# Patient Record
Sex: FEMALE | Race: WHITE
[De-identification: names, ages, dates, MRNs, and addresses within clinical notes are randomized per-mention and may not be internally consistent; named-entity substitution may affect disease eponyms.]

---

## 2018-05-25 ENCOUNTER — HOSPITAL ENCOUNTER (INPATIENT)
Dept: HOSPITAL 17 - N03B | Age: 28
LOS: 3 days | Discharge: HOME | DRG: 872 | End: 2018-05-28
Attending: HOSPITALIST | Admitting: HOSPITALIST
Payer: COMMERCIAL

## 2018-05-25 VITALS
TEMPERATURE: 99.3 F | DIASTOLIC BLOOD PRESSURE: 77 MMHG | RESPIRATION RATE: 15 BRPM | OXYGEN SATURATION: 100 % | HEART RATE: 121 BPM | SYSTOLIC BLOOD PRESSURE: 126 MMHG

## 2018-05-25 VITALS — WEIGHT: 170.42 LBS | BODY MASS INDEX: 30.2 KG/M2 | HEIGHT: 63 IN

## 2018-05-25 VITALS
TEMPERATURE: 99.9 F | OXYGEN SATURATION: 100 % | DIASTOLIC BLOOD PRESSURE: 85 MMHG | SYSTOLIC BLOOD PRESSURE: 140 MMHG | HEART RATE: 110 BPM | RESPIRATION RATE: 22 BRPM

## 2018-05-25 VITALS
SYSTOLIC BLOOD PRESSURE: 130 MMHG | OXYGEN SATURATION: 100 % | DIASTOLIC BLOOD PRESSURE: 71 MMHG | TEMPERATURE: 99.7 F | HEART RATE: 116 BPM | RESPIRATION RATE: 20 BRPM

## 2018-05-25 VITALS — HEART RATE: 112 BPM

## 2018-05-25 VITALS — HEART RATE: 110 BPM

## 2018-05-25 VITALS — HEART RATE: 97 BPM

## 2018-05-25 VITALS — HEART RATE: 116 BPM

## 2018-05-25 DIAGNOSIS — K02.9: ICD-10-CM

## 2018-05-25 DIAGNOSIS — E87.6: ICD-10-CM

## 2018-05-25 DIAGNOSIS — R65.20: ICD-10-CM

## 2018-05-25 DIAGNOSIS — F17.210: ICD-10-CM

## 2018-05-25 DIAGNOSIS — G35: ICD-10-CM

## 2018-05-25 DIAGNOSIS — K12.2: ICD-10-CM

## 2018-05-25 DIAGNOSIS — Z88.1: ICD-10-CM

## 2018-05-25 DIAGNOSIS — G43.909: ICD-10-CM

## 2018-05-25 DIAGNOSIS — A41.9: Primary | ICD-10-CM

## 2018-05-25 DIAGNOSIS — L02.01: ICD-10-CM

## 2018-05-25 DIAGNOSIS — H46.9: ICD-10-CM

## 2018-05-25 DIAGNOSIS — K04.7: ICD-10-CM

## 2018-05-25 LAB
ALBUMIN SERPL-MCNC: 3.1 GM/DL (ref 3.4–5)
ALP SERPL-CCNC: 132 U/L (ref 45–117)
ALT SERPL-CCNC: 43 U/L (ref 10–53)
AST SERPL-CCNC: 15 U/L (ref 15–37)
BASOPHILS # BLD AUTO: 0 TH/MM3 (ref 0–0.2)
BASOPHILS NFR BLD: 0.2 % (ref 0–2)
BILIRUB SERPL-MCNC: 0.5 MG/DL (ref 0.2–1)
BUN SERPL-MCNC: 3 MG/DL (ref 7–18)
CALCIUM SERPL-MCNC: 9.1 MG/DL (ref 8.5–10.1)
CHLORIDE SERPL-SCNC: 103 MEQ/L (ref 98–107)
CREAT SERPL-MCNC: 0.61 MG/DL (ref 0.5–1)
EOSINOPHIL # BLD: 0.1 TH/MM3 (ref 0–0.4)
EOSINOPHIL NFR BLD: 0.3 % (ref 0–4)
ERYTHROCYTE [DISTWIDTH] IN BLOOD BY AUTOMATED COUNT: 13.1 % (ref 11.6–17.2)
GFR SERPLBLD BASED ON 1.73 SQ M-ARVRAT: 117 ML/MIN (ref 89–?)
GLUCOSE SERPL-MCNC: 82 MG/DL (ref 74–106)
HCO3 BLD-SCNC: 24.9 MEQ/L (ref 21–32)
HCT VFR BLD CALC: 35.9 % (ref 35–46)
HGB BLD-MCNC: 11.9 GM/DL (ref 11.6–15.3)
LYMPHOCYTES # BLD AUTO: 1.3 TH/MM3 (ref 1–4.8)
LYMPHOCYTES NFR BLD AUTO: 7.3 % (ref 9–44)
MCH RBC QN AUTO: 29.2 PG (ref 27–34)
MCHC RBC AUTO-ENTMCNC: 33.3 % (ref 32–36)
MCV RBC AUTO: 87.9 FL (ref 80–100)
MONOCYTE #: 1.6 TH/MM3 (ref 0–0.9)
MONOCYTES NFR BLD: 9.1 % (ref 0–8)
NEUTROPHILS # BLD AUTO: 15.1 TH/MM3 (ref 1.8–7.7)
NEUTROPHILS NFR BLD AUTO: 83.1 % (ref 16–70)
PLATELET # BLD: 468 TH/MM3 (ref 150–450)
PMV BLD AUTO: 8.6 FL (ref 7–11)
PROT SERPL-MCNC: 7.6 GM/DL (ref 6.4–8.2)
RBC # BLD AUTO: 4.08 MIL/MM3 (ref 4–5.3)
SODIUM SERPL-SCNC: 138 MEQ/L (ref 136–145)
WBC # BLD AUTO: 18.2 TH/MM3 (ref 4–11)

## 2018-05-25 PROCEDURE — 87116 MYCOBACTERIA CULTURE: CPT

## 2018-05-25 PROCEDURE — 87040 BLOOD CULTURE FOR BACTERIA: CPT

## 2018-05-25 PROCEDURE — 80202 ASSAY OF VANCOMYCIN: CPT

## 2018-05-25 PROCEDURE — 80053 COMPREHEN METABOLIC PANEL: CPT

## 2018-05-25 PROCEDURE — 87205 SMEAR GRAM STAIN: CPT

## 2018-05-25 PROCEDURE — 85025 COMPLETE CBC W/AUTO DIFF WBC: CPT

## 2018-05-25 PROCEDURE — 83735 ASSAY OF MAGNESIUM: CPT

## 2018-05-25 PROCEDURE — 70487 CT MAXILLOFACIAL W/DYE: CPT

## 2018-05-25 PROCEDURE — 87015 SPECIMEN INFECT AGNT CONCNTJ: CPT

## 2018-05-25 PROCEDURE — 87102 FUNGUS ISOLATION CULTURE: CPT

## 2018-05-25 PROCEDURE — 87070 CULTURE OTHR SPECIMN AEROBIC: CPT

## 2018-05-25 PROCEDURE — 93005 ELECTROCARDIOGRAM TRACING: CPT

## 2018-05-25 PROCEDURE — 87641 MR-STAPH DNA AMP PROBE: CPT

## 2018-05-25 PROCEDURE — 87206 SMEAR FLUORESCENT/ACID STAI: CPT

## 2018-05-25 PROCEDURE — 80048 BASIC METABOLIC PNL TOTAL CA: CPT

## 2018-05-25 PROCEDURE — 83605 ASSAY OF LACTIC ACID: CPT

## 2018-05-25 RX ADMIN — FAMOTIDINE SCH MG: 10 INJECTION, SOLUTION INTRAVENOUS at 20:12

## 2018-05-25 RX ADMIN — PHENYTOIN SODIUM SCH MLS/HR: 50 INJECTION INTRAMUSCULAR; INTRAVENOUS at 15:00

## 2018-05-25 RX ADMIN — ONDANSETRON PRN MG: 4 TABLET, ORALLY DISINTEGRATING ORAL at 17:34

## 2018-05-25 RX ADMIN — NYSTATIN SCH ML: 100000 SUSPENSION ORAL at 17:34

## 2018-05-25 RX ADMIN — MORPHINE SULFATE PRN MG: 2 INJECTION, SOLUTION INTRAMUSCULAR; INTRAVENOUS at 15:47

## 2018-05-25 RX ADMIN — AZTREONAM SCH MLS/HR: 2 INJECTION, POWDER, LYOPHILIZED, FOR SOLUTION INTRAMUSCULAR; INTRAVENOUS at 20:11

## 2018-05-25 RX ADMIN — DEXAMETHASONE SODIUM PHOSPHATE SCH MG: 4 INJECTION, SOLUTION INTRAMUSCULAR; INTRAVENOUS at 17:34

## 2018-05-25 RX ADMIN — MORPHINE SULFATE PRN MG: 2 INJECTION, SOLUTION INTRAMUSCULAR; INTRAVENOUS at 20:14

## 2018-05-25 RX ADMIN — MORPHINE SULFATE PRN MG: 2 INJECTION, SOLUTION INTRAMUSCULAR; INTRAVENOUS at 18:03

## 2018-05-25 RX ADMIN — SODIUM CHLORIDE SCH MLS/HR: 900 INJECTION, SOLUTION INTRAVENOUS at 21:30

## 2018-05-25 RX ADMIN — AZTREONAM SCH MLS/HR: 2 INJECTION, POWDER, LYOPHILIZED, FOR SOLUTION INTRAMUSCULAR; INTRAVENOUS at 15:00

## 2018-05-25 RX ADMIN — NYSTATIN SCH ML: 100000 SUSPENSION ORAL at 20:12

## 2018-05-25 RX ADMIN — DEXAMETHASONE SODIUM PHOSPHATE SCH MG: 4 INJECTION, SOLUTION INTRAMUSCULAR; INTRAVENOUS at 14:15

## 2018-05-25 RX ADMIN — SODIUM CHLORIDE SCH MLS/HR: 900 INJECTION, SOLUTION INTRAVENOUS at 16:00

## 2018-05-25 RX ADMIN — SODIUM CHLORIDE SCH MLS/HR: 900 INJECTION, SOLUTION INTRAVENOUS at 10:41

## 2018-05-25 NOTE — HHI.HP
HPI


Service


Critical Care Medicine


Primary Care Physician


Unknown


Admission Diagnosis





Diagnosis:  


(1) Right facial abscess


Diagnosis:  Principal





(2) Sepsis


Diagnosis:  Principal





(3) Optic neuritis


Diagnosis:  Secondary





(4) Multiple sclerosis


Diagnosis:  Secondary





(5) Migraine


Diagnosis:  Secondary





Chief Complaint:  


Right facial swelling


Travel History


International Travel<30 Days:  No


Contact w/Intl Traveler <30 Da:  No


Traveled to Known Affected Are:  No





Sepsis Criteria


SIRS Criteria (2 or more):  Heart rate over 90, WBC > 55179, < 4000 or > 10% 

bands


Sepsis Criteria (SIRS+source):  Infect source susp/known


Criteria Outcome:  Meets sepsis criteria


History of Present Illness


Patient is a 27-year-old  female with past medical history of multiple 

sclerosis, migraine headaches.  Patient states that approximately 2 weeks ago 

started having pain and swelling of the right side of the cheek, which 

continued to increase in size despite treatment with antibiotics as outpatient (

5 days of amoxicillin followed by 7 days of Levofloxacin).  Patient was 

admitted to the [facility] yesterday under the primary care doctor 

and a CT of the face apparently showed right sided abscess. (Unfortunately 

there is no CT images or report for review).  Patient had been receiving 

vancomycin and Levaquin at the outside hospital.  Labs from [facility] reviewed, WBC count was 18.7 other pertinent labs included a potassium 

of 3.2.  Due to lack of improvement and unavailability of ENT consultant, 

patient was transferred to [facility].  I evaluated the patient 

immediately in ICU.  Patient has enlarged facial swelling on the right side of 

the face which is very tender.  Patient also had subjective fever, currently is 

tachycardic heart rate in 120s.  I have requested a repeat CT of the face with 

IV contrast.  I will start her on IV vancomycin, Azactam, Flagyl.  Consult 

requested from oral and maxillofacial surgery.  I am unable to examine the oral 

cavity in detail due to severe pain in mouth opening, but most likely patient 

has dental abscess with spread to surrounding soft tissue





Review of Systems


ROS Limitations:  Other (as per HPI)





Past Family Social History


Allergies:  


Coded Allergies:  


     cephalexin (Verified  Allergy, Severe, 18)


 Blisters


Past Medical History


Multiple sclerosis


Optic neuritis flareup approximately 2 weeks ago


Migraine headache


Past Surgical History


Knee surgery


Laparoscopy


Reported Medications


Do not take any scheduled medications for MS


Fioricet as needed for migraine


Active Ordered Medications


Reviewed


Family History


Mother with MS


Social History


Smokes about a pack of cigarettes a day, occasional alcohol use


Denies illicit drug use





Physical Exam


Physical Exam


GENERAL: 27-year-old  female who is in moderate distress due to pain


SKIN: Warm and dry.


HEAD: Atraumatic. Normocephalic. 


ENT: Right side of the face is diffusely swollen predominantly over the right 

mandibular angle region.  Mouth opening is limited due to pain and this limits 

oral cavity exam


NECK: Trachea midline.  No obstruction.  No stridor


CARDIOVASCULAR: Tachycardic rate and rhythm.  S1, S2 normal.  No JVD.


RESPIRATORY: No accessory muscle use. Clear to auscultation.  No adventitious 

sounds.  


GASTROINTESTINAL: Abdomen soft, non-tender, nondistended.  No guarding.  


MUSCULOSKELETAL: Extremities without clubbing, cyanosis, or edema. No obvious 

deformities.  Warm, well perfused.


NEUROLOGICAL: Awake and alert. No obvious cranial nerve deficits.  Motor 

grossly normal.  Normal speech.





Septic Shock Reassessment


Septic shock perfusion:  reassessment completed





Caprini VTE Risk Assessment


Caprini VTE Risk Assessment:  Mod/High Risk (score >= 2)


Caprini Risk Assessment Model











 Point Value = 1          Point Value = 2  Point Value = 3  Point Value = 5


 


Age 41-60


Minor surgery


BMI > 25 kg/m2


Swollen legs


Varicose veins


Pregnancy or postpartum


History of unexplained or recurrent


   spontaneous 


Oral contraceptives or hormone


   replacement


Sepsis (< 1 month)


Serious lung disease, including


   pneumonia (< 1 month)


Abnormal pulmonary function


Acute myocardial infarction


Congestive heart failure (< 1 month)


History of inflammatory bowel disease


Medical patient at bed rest Age 61-74


Arthroscopic surgery


Major open surgery (> 45 min)


Laparoscopic surgery (> 45 min)


Malignancy


Confined to bed (> 72 hours)


Immobilizing plaster cast


Central venous access Age >= 75


History of VTE


Family history of VTE


Factor V Leiden


Prothrombin 69943G


Lupus anticoagulant


Anticardiolipin antibodies


Elevated serum homocysteine


Heparin-induced thrombocytopenia


Other congenital or acquired


   thrombophilia Stroke (< 1 month)


Elective arthroplasty


Hip, pelvis, or leg fracture


Acute spinal cord injury (< 1 month)








Prophylaxis Regimen











   Total Risk


Factor Score Risk Level Prophylaxis Regimen


 


0-1      Low Early ambulation


 


2 Moderate Order ONE of the following:


*Sequential Compression Device (SCD)


*Heparin 5000 units SQ BID


 


3-4 Higher Order ONE of the following medications:


*Heparin 5000 units SQ TID


*Enoxaparin/Lovenox 40 mg SQ daily (WT < 150 kg, CrCl > 30 mL/min)


*Enoxaparin/Lovenox 30 mg SQ daily (WT < 150 kg, CrCl > 10-29 mL/min)


*Enoxaparin/Lovenox 30 mg SQ BID (WT < 150 kg, CrCl > 30 mL/min)


AND/OR


*Sequential Compression Device (SCD)


 


5 or more Highest Order ONE of the following medications:


*Heparin 5000 units SQ TID (Preferred with Epidurals)


*Enoxaparin/Lovenox 40 mg SQ daily (WT < 150 kg, CrCl > 30 mL/min)


*Enoxaparin/Lovenox 30 mg SQ daily (WT < 150 kg, CrCl > 10-29 mL/min)


*Enoxaparin/Lovenox 30 mg SQ BID (WT < 150 kg, CrCl > 30 mL/min)


AND


*Sequential Compression Device (SCD)











Assessment and Plan


Assessment and Plan


NEURO: 


Right facial pain


Migraine headaches


Multiple sclerosis


-Morphine as needed for pain


-Avoid excessive sedation





RESP/ENT/ID: 


Right facial swelling (Differential Dental abscess vs Parotitis/stone)


Sepsis


-Nasal cannula oxygen


-DuoNeb every 6 hours as needed


-CT facial bones with IV contrast Stat


-Broad-spectrum antibiotics with vancomycin, Azactam, Flagyl


-OMFS consulted


-Decadron 4 mg IV every 6 hours for facial swelling for 48 hours


-Blood cultures requested





CV: 


Sinus tachycardia/SIRS


-Normal saline IV fluids 1L bolus and 84 ml per hour


-Monitor heart rate blood pressure





GI: 


-NPO until OMFS plan is determined.  IV famotidine





: 


-Monitor renal function closely.  No indication for Turner catheter at this





HEME: 


-Monitor CBC, CMP, coags





ENDO: 


Hypokalemia


-Electrolyte replacement per protocol





PROPH: 


-Bilateral lower extremity SCDs.  Hold off chemical DVT prophylaxis at this 

time in anticipation of surgical procedures.  IV famotidine for GI prophylaxis





LINES: 


-Utilize peripheral IVs, central line if needed





CC time 35 min


Code Status


Full


Discussed Condition With


Dr. Duran





Problem Qualifiers





(1) Sepsis:  


Qualified Codes:  A41.9 - Sepsis, unspecified organism


(2) Migraine:  








Irais Pearl MD May 25, 2018 14:04

## 2018-05-25 NOTE — MB
cc:

Sameer Duran DMD

Sameer Duran DMD

****

 

 

DATE:

05/25/2018

 

FAX TO DR. DURAN'S OFFICE

 

REASON FOR CONSULTATION:

Right facial abscess.

 

HISTORY OF PRESENT ILLNESS:

This is a 27-year-old female that has been having, for the last 10 

days, swelling on the right side of the face.  It has been 

progressively getting worse.  She was seen at an urgent care center 

and she was seen by her physician and given antibiotics but it never 

resolved.  She was then admitted to [facility] up in 

[place]. She was transferred over here to [facility] for evaluation.  

I have seen and examined this patient this evening.  Her , 

family/friends and nurse are at bedside.  She is alert, awake and 

oriented x3 in no acute distress.  Denies any fever, chills, nausea, 

vomiting, any shortness of breath or any difficulty breathing or any 

difficulty swallowing.  She is hungry, wants to eat something.  She 

does not have a dentist or an oral surgeon.

 

PAST MEDICAL HISTORY:

Migraines, also multiple sclerosis.

 

MEDICATIONS:

Denies.

 

ALLERGIES:

CEPHALEXIN WHICH CAUSES BLISTERS.

 

PAST SURGICAL HISTORY:

She says she had a knee surgery.

 

SOCIAL HISTORY:

Smokes 1 pack per day of cigarettes, occasional alcohol, but denies 

any illicit drug use.

 

PHYSICAL EXAMINATION:

Facial area and the neck has been palpated.  The trachea is at 

midline.  She has a swelling on the right side of his face posteriorly

extending from the mid face all the way down through the angle of the 

mandible.  It is soft.  It is tender to palpation.  Appears to have 

fluctuance that is sitting there.  Intraorally, she has some trismus 

that is opening, but I am able to put approximately 1/2 fingerbreadth 

width apart and able to open the mouth, but she is guarding against 

pain.  I do not see any elevation of floor of the mouth or the tongue.

 The rest of the exam is limited secondary to her swelling.

 

There is no active pus that is noted.  She has generalized poor 

dentition.

 

IMAGING STUDIES:

CT scan of the facial bones shows swelling right side of the face 

extending to the neck involving the masseteric space, extending down 

to the medial aspect of the mandible going to past the mylohyoid 

muscle, submandibular region, sublingual region.  The scan also shows 

that she appears to be wisdom tooth #32 with a periapical radiolucency

that is broken into the cortex on the lingual.  She also has tooth #31

which is a second molar which got decay on it, appears to have a 

periapical radiolucency on that also and then she also appears to have

involvement of tooth #30.  We will evaluate that clinically also 

during the procedure tomorrow.

 

She has a right maxillary sinus.  She has a mucocele inflammation 

there.

 

Temperature 99.7, pulse is 116.

 

LABORATORY DATA:

White count is 18.2 with an H and H of  11.9/ 35.9 with platelets of 

468.

 

IMPRESSION AND PLAN:

This is a 27-year-old female with longstanding history of swelling now

with decayed teeth, specifically #32 and #31 possibly #30 involving a 

small size space abscess/swelling involving the right face, right 

submandibular region, medial aspect of the mandible and it is taking 

into the masseteric space.

 

PLAN:

Extract teeth #31 and 32 and possibly #30 tomorrow.  Incision and 

drainage of this multi-space infection/abscess.  Benefits, risks, 

indications of the procedure, procedure in detail and the options of 

no treatment were all discussed with this patient.  Risks not limited 

any postop pain, infection, bleeding, damage to the adjacent teeth, 

soft tissue, hard tissue, anesthesia complication which includes 

death, recurrence of the infection, further surgeries as required.  

Discussed with the patient in detail that if I see any teeth have 

mobility and severe decayed,  they will be taken out on the right side

of the mandible specifically in the posterior site.  Dental implants 

or any other restorative options was also discussed.

 

The patient has been counseled on the importance of dental hygiene, 

following up with  a dentist and an  oral surgeon.  All questions and 

concerns were addressed.

 

 

__________________________________

Sameer Duran DMD

 

 

RT/SA

D: 05/25/2018, 07:08 PM

T: 05/25/2018, 08:22 PM

Visit #: [id number]

Job #: [id number]

## 2018-05-25 NOTE — RADRPT
EXAM DATE:  5/25/2018 4:27 PM EDT

AGE/SEX:        27 years / Female



INDICATIONS:  Right facial swelling.  Evaluate for abscess. 



CLINICAL DATA:  This is the patient's subsequent encounter. Patient reports that signs and symptoms h
ave been present for 2 weeks and indicates a pain score of 7/10. 

                                                                          

MEDICAL/SURGICAL HISTORY:       None. None.



RADIATION DOSE:  53.40 CTDI (mGy)









COMPARISON:      No prior [place] exams available for comparison.



TECHNIQUE:  Contiguous images in the axial and coronal planes were obtained using helical multirow de
tector technique with 80 ml Omnipaque 350 (iohexol)  nonionic water-soluble contrast as a single exam
 dose.  Using automated exposure control and adjustment of the mA and/or kV according to patient size
, radiation dose was kept as low as reasonably achievable to obtain optimal diagnostic quality images
.



FINDINGS: 

There is periapical lucency around the posterior most mandibular molar tooth on the right with medial
 cortical breakthrough. There is a multilocular abscess in the surrounding perimandibular tissues wit
h protrusion of loculated fluid in the cord producing displacement of the floor of mouth structures a
nd submandibular salivary gland.



Elsewhere, the orbits are symmetric and unremarkable. There is a mucous retention cyst in the right m
axillary sinus.



There are small cervical lymph nodes present. No adenopathy.





CONCLUSION:

Mandibular molar tooth abscess with surrounding multilocular perimandibular soft tissue abscess.



Electronically signed by: Dae Mendez MD  5/25/2018 4:35 PM EDT

## 2018-05-26 VITALS
HEART RATE: 96 BPM | TEMPERATURE: 98.7 F | OXYGEN SATURATION: 97 % | RESPIRATION RATE: 19 BRPM | DIASTOLIC BLOOD PRESSURE: 73 MMHG | SYSTOLIC BLOOD PRESSURE: 133 MMHG

## 2018-05-26 VITALS
SYSTOLIC BLOOD PRESSURE: 134 MMHG | OXYGEN SATURATION: 98 % | TEMPERATURE: 99 F | HEART RATE: 118 BPM | DIASTOLIC BLOOD PRESSURE: 76 MMHG | RESPIRATION RATE: 17 BRPM

## 2018-05-26 VITALS
DIASTOLIC BLOOD PRESSURE: 64 MMHG | SYSTOLIC BLOOD PRESSURE: 127 MMHG | HEART RATE: 97 BPM | OXYGEN SATURATION: 98 % | TEMPERATURE: 97.9 F | RESPIRATION RATE: 16 BRPM

## 2018-05-26 VITALS
OXYGEN SATURATION: 100 % | SYSTOLIC BLOOD PRESSURE: 128 MMHG | DIASTOLIC BLOOD PRESSURE: 73 MMHG | RESPIRATION RATE: 16 BRPM | HEART RATE: 97 BPM | TEMPERATURE: 99.2 F

## 2018-05-26 VITALS
DIASTOLIC BLOOD PRESSURE: 67 MMHG | SYSTOLIC BLOOD PRESSURE: 123 MMHG | OXYGEN SATURATION: 100 % | TEMPERATURE: 99 F | RESPIRATION RATE: 17 BRPM | HEART RATE: 87 BPM

## 2018-05-26 VITALS
RESPIRATION RATE: 14 BRPM | HEART RATE: 88 BPM | OXYGEN SATURATION: 100 % | DIASTOLIC BLOOD PRESSURE: 72 MMHG | SYSTOLIC BLOOD PRESSURE: 120 MMHG | TEMPERATURE: 98.9 F

## 2018-05-26 VITALS — HEART RATE: 88 BPM

## 2018-05-26 VITALS — HEART RATE: 87 BPM

## 2018-05-26 VITALS — HEART RATE: 89 BPM

## 2018-05-26 LAB
BUN SERPL-MCNC: 6 MG/DL (ref 7–18)
CALCIUM SERPL-MCNC: 8.8 MG/DL (ref 8.5–10.1)
CHLORIDE SERPL-SCNC: 108 MEQ/L (ref 98–107)
CREAT SERPL-MCNC: 0.49 MG/DL (ref 0.5–1)
GFR SERPLBLD BASED ON 1.73 SQ M-ARVRAT: 150 ML/MIN (ref 89–?)
GLUCOSE SERPL-MCNC: 135 MG/DL (ref 74–106)
HCO3 BLD-SCNC: 22.1 MEQ/L (ref 21–32)
MAGNESIUM SERPL-MCNC: 2.3 MG/DL (ref 1.5–2.5)
SODIUM SERPL-SCNC: 139 MEQ/L (ref 136–145)

## 2018-05-26 PROCEDURE — 0CDXXZ1 EXTRACTION OF LOWER TOOTH, MULTIPLE, EXTERNAL APPROACH: ICD-10-PCS | Performed by: DENTIST

## 2018-05-26 PROCEDURE — 0W9530Z DRAINAGE OF LOWER JAW WITH DRAINAGE DEVICE, PERCUTANEOUS APPROACH: ICD-10-PCS | Performed by: DENTIST

## 2018-05-26 RX ADMIN — DEXAMETHASONE SODIUM PHOSPHATE SCH MG: 4 INJECTION, SOLUTION INTRAMUSCULAR; INTRAVENOUS at 05:28

## 2018-05-26 RX ADMIN — VANCOMYCIN HYDROCHLORIDE SCH MLS/HR: 1 INJECTION, SOLUTION INTRAVENOUS at 16:22

## 2018-05-26 RX ADMIN — FAMOTIDINE SCH MG: 10 INJECTION, SOLUTION INTRAVENOUS at 21:00

## 2018-05-26 RX ADMIN — NICOTINE SCH PATCH: 21 PATCH, EXTENDED RELEASE TOPICAL at 14:09

## 2018-05-26 RX ADMIN — HYDROMORPHONE HYDROCHLORIDE PRN MG: 1 INJECTION, SOLUTION INTRAMUSCULAR; INTRAVENOUS; SUBCUTANEOUS at 18:21

## 2018-05-26 RX ADMIN — ONDANSETRON PRN MG: 4 TABLET, ORALLY DISINTEGRATING ORAL at 02:59

## 2018-05-26 RX ADMIN — AZTREONAM SCH MLS/HR: 2 INJECTION, POWDER, LYOPHILIZED, FOR SOLUTION INTRAMUSCULAR; INTRAVENOUS at 09:00

## 2018-05-26 RX ADMIN — HYDROMORPHONE HYDROCHLORIDE PRN MG: 1 INJECTION, SOLUTION INTRAMUSCULAR; INTRAVENOUS; SUBCUTANEOUS at 22:20

## 2018-05-26 RX ADMIN — MORPHINE SULFATE PRN MG: 2 INJECTION, SOLUTION INTRAMUSCULAR; INTRAVENOUS at 21:12

## 2018-05-26 RX ADMIN — SODIUM CHLORIDE SCH MLS/HR: 900 INJECTION, SOLUTION INTRAVENOUS at 16:59

## 2018-05-26 RX ADMIN — SODIUM CHLORIDE SCH MLS/HR: 900 INJECTION, SOLUTION INTRAVENOUS at 04:20

## 2018-05-26 RX ADMIN — HYDROMORPHONE HYDROCHLORIDE PRN MG: 1 INJECTION, SOLUTION INTRAMUSCULAR; INTRAVENOUS; SUBCUTANEOUS at 15:01

## 2018-05-26 RX ADMIN — MORPHINE SULFATE PRN MG: 2 INJECTION, SOLUTION INTRAMUSCULAR; INTRAVENOUS at 05:28

## 2018-05-26 RX ADMIN — VANCOMYCIN HYDROCHLORIDE SCH MLS/HR: 1 INJECTION, SOLUTION INTRAVENOUS at 07:50

## 2018-05-26 RX ADMIN — MORPHINE SULFATE PRN MG: 2 INJECTION, SOLUTION INTRAMUSCULAR; INTRAVENOUS at 00:04

## 2018-05-26 RX ADMIN — MORPHINE SULFATE PRN MG: 2 INJECTION, SOLUTION INTRAMUSCULAR; INTRAVENOUS at 07:45

## 2018-05-26 RX ADMIN — NYSTATIN SCH ML: 100000 SUSPENSION ORAL at 13:06

## 2018-05-26 RX ADMIN — CHLORHEXIDINE GLUCONATE SCH PACK: 500 CLOTH TOPICAL at 04:20

## 2018-05-26 RX ADMIN — AZTREONAM SCH MLS/HR: 2 INJECTION, POWDER, LYOPHILIZED, FOR SOLUTION INTRAMUSCULAR; INTRAVENOUS at 02:45

## 2018-05-26 RX ADMIN — PHENYTOIN SODIUM SCH MLS/HR: 50 INJECTION INTRAMUSCULAR; INTRAVENOUS at 01:00

## 2018-05-26 RX ADMIN — MORPHINE SULFATE PRN MG: 2 INJECTION, SOLUTION INTRAMUSCULAR; INTRAVENOUS at 16:26

## 2018-05-26 RX ADMIN — ONDANSETRON PRN MG: 4 TABLET, ORALLY DISINTEGRATING ORAL at 21:00

## 2018-05-26 RX ADMIN — MORPHINE SULFATE PRN MG: 2 INJECTION, SOLUTION INTRAMUSCULAR; INTRAVENOUS at 13:06

## 2018-05-26 RX ADMIN — AZTREONAM SCH MLS/HR: 2 INJECTION, POWDER, LYOPHILIZED, FOR SOLUTION INTRAMUSCULAR; INTRAVENOUS at 22:17

## 2018-05-26 RX ADMIN — NYSTATIN SCH ML: 100000 SUSPENSION ORAL at 22:17

## 2018-05-26 RX ADMIN — DEXAMETHASONE SODIUM PHOSPHATE SCH MG: 4 INJECTION, SOLUTION INTRAMUSCULAR; INTRAVENOUS at 00:04

## 2018-05-26 RX ADMIN — NYSTATIN SCH ML: 100000 SUSPENSION ORAL at 09:00

## 2018-05-26 RX ADMIN — AZTREONAM SCH MLS/HR: 2 INJECTION, POWDER, LYOPHILIZED, FOR SOLUTION INTRAMUSCULAR; INTRAVENOUS at 14:43

## 2018-05-26 RX ADMIN — MORPHINE SULFATE PRN MG: 2 INJECTION, SOLUTION INTRAMUSCULAR; INTRAVENOUS at 11:12

## 2018-05-26 RX ADMIN — SODIUM CHLORIDE SCH MLS/HR: 900 INJECTION, SOLUTION INTRAVENOUS at 22:18

## 2018-05-26 RX ADMIN — PHENYTOIN SODIUM SCH MLS/HR: 50 INJECTION INTRAMUSCULAR; INTRAVENOUS at 22:15

## 2018-05-26 RX ADMIN — FAMOTIDINE SCH MG: 10 INJECTION, SOLUTION INTRAVENOUS at 09:00

## 2018-05-26 RX ADMIN — NYSTATIN SCH ML: 100000 SUSPENSION ORAL at 18:00

## 2018-05-26 RX ADMIN — VANCOMYCIN HYDROCHLORIDE SCH MLS/HR: 1 INJECTION, SOLUTION INTRAVENOUS at 00:04

## 2018-05-26 NOTE — EKG
Date Performed: 05/26/2018       Time Performed: 03:40:14

 

PTAGE:      27 years

 

EKG:      Sinus rhythm 

 

 rSr'(V1) - probable normal variant Normal ECG 

 

NO PREVIOUS TRACING            

 

DOCTOR:   Alli Duvall  Interpretating Date/Time  05/26/2018 13:33:08

## 2018-05-26 NOTE — HHI.CCPN
Subjective


Remarks/Hospital Course


Diagnosis:  


(1) Right facial abscess


Diagnosis:  Principal





(2) Sepsis, severe


Diagnosis:  Principal





(3) Optic neuritis


Diagnosis:  Secondary





(4) Multiple sclerosis


Diagnosis:  Secondary





(5) Migraine


Diagnosis:  Secondary





Chief Complaint:  


Right facial swelling








Patient is a 27-year-old  female with past medical history of multiple 

sclerosis, migraine headaches.  Patient states that approximately 2 weeks ago 

started having pain and swelling of the right side of the cheek, which 

continued to increase in size despite treatment with antibiotics as outpatient (

5 days of amoxicillin followed by 7 days of Levofloxacin).  Patient was 

admitted to the [facility] yesterday under the primary care doctor 

and a CT of the face apparently showed right sided abscess. (Unfortunately 

there is no CT images or report for review).  Patient had been receiving 

vancomycin and Levaquin at the outside hospital.  Labs from [facility] reviewed, WBC count was 18.7 other pertinent labs included a potassium 

of 3.2.  Due to lack of improvement and unavailability of ENT consultant, 

patient was transferred to [facility].  I evaluated the patient 

immediately in ICU.  Patient has enlarged facial swelling on the right side of 

the face which is very tender.  Patient also had subjective fever, currently is 

tachycardic heart rate in 120s.  I have requested a repeat CT of the face with 

IV contrast.  I will start her on IV vancomycin, Azactam, Flagyl.  Consult 

requested from oral and maxillofacial surgery.  I am unable to examine the oral 

cavity in detail due to severe pain in mouth opening, but most likely patient 

has dental abscess with spread to surrounding soft tissue





05/26: Tachycardia, fever, leukocytosis. Molar abscess right side will require 

teeth extraction and drainage. Remains septic. Broad antibiotic coverage 

infusing. Suitable for surgery today.





Objective





Vital Signs








  Date Time  Temp Pulse Resp B/P (MAP) Pulse Ox O2 Delivery O2 Flow Rate FiO2


 


5/26/18 06:00  89      


 


5/26/18 04:00 98.9  14 120/72 (88) 100   


 


5/25/18 19:00      Room Air  








Result Diagram:  


5/25/18 1440                                                                   

             5/26/18 0402





Objective Remarks


GENERAL: 27-year-old  female, in moderate distress due to mandibular 

pain


SKIN: Warm and dry.


HEAD: Atraumatic. Normocephalic. 


ENT: Right side of the face is diffusely swollen predominantly over the right 

mandibular angle. Mouth opening is limited due to pain and this limits oral 

cavity exam


NECK: Trachea midline.  No obstruction.  No stridor. Airway widely patent.


CARDIOVASCULAR: Tachycardic rate and rhythm.  S1, S2 normal.  No JVD.


RESPIRATORY: No accessory muscle use. Clear to auscultation.  No adventitious 

sounds. Comfortable pattern.


GASTROINTESTINAL: Abdomen soft, non-tender, nondistended.  No guarding.  BS 

active.


MUSCULOSKELETAL: Extremities without clubbing, cyanosis, or edema. No obvious 

deformities.  Warm, well perfused.


NEUROLOGICAL: Awake and alert. No obvious cranial nerve deficits.  Motor 

grossly normal.  Normal speech.





A/P


Assessment and Plan


NEURO: 


Right facial pain


Migraine headaches


Multiple sclerosis


-Morphine as needed for pain


-Avoid excessive sedation





RESP/ENT/ID: 


Right facial swelling (Differential Dental abscess vs Parotitis/stone)


Sepsis


-Nasal cannula oxygen


-DuoNeb every 6 hours as needed


-CT facial bones with IV contrast Stat


-Broad-spectrum antibiotics with vancomycin, Azactam, Flagyl


-OMFS consulted


-Decadron 4 mg IV every 6 hours for facial swelling for 48 hours


-Blood cultures pending.





CV: 


Sinus tachycardia/SIRS


-Normal saline IV fluids 1L bolus and 84 ml per hour


-Monitor heart rate blood pressure





GI: 


-NPO for OMFS surgery today.  IV famotidine





: 


-Monitor renal function closely.  No indication for Turner catheter at this





HEME: 


-Monitor CBC, CMP, coags





ENDO: 


Hypokalemia


-Electrolyte replacement per protocol





PROPH: 


-Bilateral lower extremity SCDs.  Hold off chemical DVT prophylaxis at this 

time in anticipation of surgical procedures.  IV famotidine for GI prophylaxis





LINES: 


-Utilize peripheral IVs, central line if needed





Overall impression: Patient is critically ill with severe sepsis from very 

large mandibular tooth abscess. Airway remains widely patent. Urgent surgical 

drainage and extraction of teeth is required.





Critical care 38 mins











Javy Rutledge MD May 26, 2018 07:39

## 2018-05-26 NOTE — HHI.PR
__________________________________________________





Immediate Post Op Note


Procedure Date:


May 26, 2018


Pre Op Diagnosis:  


right face/neck multi space abscess/infection involving right submandibular/

sublingual


masseteric spaces and medial aspect of the mandible


decayed teeth # 32/31/30


Post Op Diagnosis:  


quin


Surgeon:


Sameer Duran


Assistant(s):


olya lowery


Procedure:


I & D right face/neck multi space abscess/infection involving right 

submandibular/sublingual


masseteric spaces and medial aspect of the mandible


 extraction of decayed teeth # 32/31/30


Findings:


pus approximately 50 cc


thrush


Complications:


none


Specimen(s) removed:


pus- neck sent for culture


Estimated blood loss:


5cc


Anesthesia:  General, Local (2%lidocaine with 1:200, 000 epi x 5cc;  0.5%

marcaine with 1:200,000 epi x 5cc)


Drains:  Penrose (penrose x 3 - 2 intraoral and 1 right sublingual region,  #12 

red rubber catherer right sub mandibualr region into mouth)


Patient to:  PACU


Patient Condition:  Good


Date/Time of Procedure:  SEE SURGICAL CARE RECORD











Sameer Duran DMD May 26, 2018 10:29

## 2018-05-26 NOTE — MP
cc:

Sameer Duran DMD

****

 

 

DATE OF OPERATION:

05/26/2018

 

DATE OF PROCEDURE:

05/26/2018

 

PREOPERATIVE DIAGNOSES:

Right face/neck multispace abscess/infection involving the right 

submandibular/sublingual/masseteric spaces in the medial aspect of the

mandible.  Also, decayed teeth #32, 31 and 30.

 

POSTOPERATIVE DIAGNOSES:

Right face/neck multispace abscess/infection involving the right 

submandibular/sublingual/masseteric spaces in the medial aspect of the

mandible.  Also, decayed teeth #32, 31 and 30.

 

PROCEDURE PERFORMED:

Incision and drainage of the right neck/face multispace 

abscess/infection involving the right 

submandibular/sublingual/masseteric spaces and the medial aspect of 

the mandible.  Also, extraction of decayed teeth #32, 31 and 30.

 

ANESTHESIA:

General.  Also, local anesthetic with 2% lidocaine with 1:200,000 

epinephrine, approximately 5 mL.  Also, at the end of the procedure, 

0.5% Marcaine with 1:200,000 epinephrine, approximately 5 mL.

 

SURGEON:

Sameer Duran DMD

 

FIRST ASSISTANT:

Tin Suarez.

 

COMPLICATIONS:

None.

 

SPECIMENS:

Pus was cultured and sent to microbiology.

 

DISPOSITION:

The patient tolerated the procedure well, extubated, and taken to the 

PACU.

 

INDICATIONS FOR PROCEDURE:  This is a 27-year-old female with at least

a 10-day history of increased swelling on the right side of her 

face/mouth and now going to the neck.  She was admitted to [facility] after failed outpatient therapy, and it kept getting worse 

and she was transferred here to [place] for evaluation and treatment. 

She had these teeth severely decayed #30, 31, and 32 resulting in 

causing odontogenic multispace facial edema and neck edema and 

abscess.

 

In order to restore proper form and function, it is necessary that the

patient undergo the above-listed procedures.  Benefits, risks, 

indications of the procedure, procedure in detail and the options for 

no treatment were all discussed with this patient.  Risks not limited 

to any postop pain, infection, bleeding; damage to the adjacent teeth,

soft tissue or hard tissue, anesthesia complications to include death,

recurrence of the infection, further surgeries as required.  The 

patient declines any root canal treatment on 30 and 31 teeth.

 

DESCRIPTION OF PROCEDURE:

The patient was met perioperatively, all questions and concerns were 

addressed, the right side surgical site was marked out.  Patient was 

taken to the operating suite and put on the table in a supine 

position.  She underwent GlideScope intubation without any 

complications.  It was noted that she had some thrush that is sitting 

there.  Eyes were taped shut.  All pressure points were padded.  At 

this time, a timeout was taken to identify the patient, the site, the 

procedure and surgeon.  All were in agreement.

 

The patient was prepped with Betadine solution.  The patient was 

draped in normal sterile fashion.  Bite block was gently placed on the

left side of the mouth, back of the throat was suctioned and a 

moistened Ray-Brendan was used as a throat pack.  I did not see any 

lateral pharyngeal space edema or any deviation of the uvula, the back

of the throat under examination under anesthesia.  She has the 

swelling that is hard and soft at times in places on the right side of

the face, mandible approaching the angle of the mandible which at this

point, I cannot palpate, going down to the neck.  The face is swollen.

 She has teeth #30, 31, and 32 already slightly moving and 31 and 32 

more moving.  She has some elevation of floor of the mouth on the 

right side.

 

Lidocaine 2% with 1:200,000 epinephrine was injected into the areas 

where I can feel the greatest area I am going to make the incision on 

the right side of the neck for the incision and drainage and then 

intraoral inferior alveolar nerve block and long buccal.

 

The mouth was irrigated with Peridex solution.

 

Attention was diverted to the outside of the neck.  an 18-gauge needle

was used and I inserted that in and pulled back a lot of pus in it.  

Then, a 15 blade was used to make a stab incision where I had 

previously gone with an 18-gauge needle in the submandibular 

sublingual regions.  Then a hemostat was used to dissect out going 

superiorly, inferiorly, medially, and laterally, both the 

submandibular and sublingual regions.  At this point, copious amount 

of pus came out.  Very foul-smelling pus poured out.

 

Then, at this point, I went intraorally.  A lateral hockey stick 

incision was made lateral to tooth #32, sulcular all the way down to 

tooth #30 and 29.  The teeth were already moving.  There decayed.  The

patient has generalized decayed teeth.  Elevator forceps was used to 

gently extract these teeth.  Pus just came out of all these extraction

sockets.

 

Then, I took the periosteal elevator, reflected off the buccal soft 

tissue/gingiva and went down subperiosteally, all the way down to the 

inferior border of the mandible, went posterior to the angle of the 

mandible and the pus just poured out again.  I did the same thing on 

the lingual aspect, on the medial aspect of the mandible straight down

and ____ pus just poured out of there.

 

All sites were irrigated intraorally and to the neck with Peridex and 

saline solution.  No more pus was noted.  I took a 1/4 inch Penrose 

drain, placed on the lingual medial aspect of the mandible and then 

placed one on the lateral aspect of the mandible going to the angle of

the mandible under the Penrose drain and secured it into place using 

2-0 silk suture.  The extraction sites were sutured into position 

using 3-0 chromic suture.  I also took a red rubber catheter #12 and 

made a hole in through that catheter itself so I can use that for 

irrigating later on.  I then fed that right through the submandibular 

incision, straight up to the lateral aspect of the mandible and that 

was secured into position using a 2-0 silk suture and then again on 

the sublingual region, a 1/4 inch Penrose drain was placed and then 

secured into position with 2-0 silk suture.

 

I irrigated all the drains once again with saline solution.  There was

significant decrease in the swelling of the right face and the neck.  

I could now palpate the angle of the mandible and the inferior border 

of the mandible.  The 4 x 4 gauze was placed where the drains in the 

neck are and was wrapped up with clean dressing.  The final end of the

procedure, 0.5% with 1:200,000 epinephrine was injected as inferior 

alveolar nerve block and long buccal block.  The pus will be sent for 

culture.  At the end of the case, all sponge and needle counts were 

all accounted for.  The patient has been extubated and taken to the 

PACU.  No complications noted.

 

 

__________________________________

SADI Paris/ROB

D: 05/26/2018, 10:28 AM

T: 05/26/2018, 12:26 PM

Visit #: [id number]

Job #: [id number]

## 2018-05-27 VITALS
RESPIRATION RATE: 16 BRPM | TEMPERATURE: 98 F | SYSTOLIC BLOOD PRESSURE: 132 MMHG | DIASTOLIC BLOOD PRESSURE: 66 MMHG | HEART RATE: 71 BPM | OXYGEN SATURATION: 100 %

## 2018-05-27 VITALS
OXYGEN SATURATION: 97 % | RESPIRATION RATE: 29 BRPM | SYSTOLIC BLOOD PRESSURE: 112 MMHG | TEMPERATURE: 99 F | HEART RATE: 74 BPM | DIASTOLIC BLOOD PRESSURE: 58 MMHG

## 2018-05-27 VITALS
OXYGEN SATURATION: 100 % | TEMPERATURE: 98.9 F | DIASTOLIC BLOOD PRESSURE: 71 MMHG | HEART RATE: 78 BPM | RESPIRATION RATE: 25 BRPM | SYSTOLIC BLOOD PRESSURE: 116 MMHG

## 2018-05-27 VITALS
OXYGEN SATURATION: 99 % | TEMPERATURE: 98.9 F | SYSTOLIC BLOOD PRESSURE: 110 MMHG | RESPIRATION RATE: 11 BRPM | HEART RATE: 73 BPM | DIASTOLIC BLOOD PRESSURE: 64 MMHG

## 2018-05-27 VITALS — HEART RATE: 64 BPM

## 2018-05-27 VITALS
TEMPERATURE: 98.7 F | HEART RATE: 66 BPM | OXYGEN SATURATION: 100 % | DIASTOLIC BLOOD PRESSURE: 68 MMHG | SYSTOLIC BLOOD PRESSURE: 116 MMHG | RESPIRATION RATE: 24 BRPM

## 2018-05-27 VITALS
DIASTOLIC BLOOD PRESSURE: 78 MMHG | RESPIRATION RATE: 21 BRPM | TEMPERATURE: 98.7 F | HEART RATE: 80 BPM | SYSTOLIC BLOOD PRESSURE: 130 MMHG | OXYGEN SATURATION: 100 %

## 2018-05-27 VITALS — HEART RATE: 88 BPM

## 2018-05-27 LAB
BASOPHILS # BLD AUTO: 0 TH/MM3 (ref 0–0.2)
BASOPHILS NFR BLD: 0 % (ref 0–2)
EOSINOPHIL # BLD: 0 TH/MM3 (ref 0–0.4)
EOSINOPHIL NFR BLD: 0 % (ref 0–4)
ERYTHROCYTE [DISTWIDTH] IN BLOOD BY AUTOMATED COUNT: 13.4 % (ref 11.6–17.2)
HCT VFR BLD CALC: 31.5 % (ref 35–46)
HGB BLD-MCNC: 10.5 GM/DL (ref 11.6–15.3)
LYMPHOCYTES # BLD AUTO: 0.8 TH/MM3 (ref 1–4.8)
LYMPHOCYTES NFR BLD AUTO: 4.7 % (ref 9–44)
MCH RBC QN AUTO: 29 PG (ref 27–34)
MCHC RBC AUTO-ENTMCNC: 33.2 % (ref 32–36)
MCV RBC AUTO: 87.3 FL (ref 80–100)
MONOCYTE #: 0.9 TH/MM3 (ref 0–0.9)
MONOCYTES NFR BLD: 5.3 % (ref 0–8)
NEUTROPHILS # BLD AUTO: 16 TH/MM3 (ref 1.8–7.7)
NEUTROPHILS NFR BLD AUTO: 90 % (ref 16–70)
PLATELET # BLD: 530 TH/MM3 (ref 150–450)
PMV BLD AUTO: 8.4 FL (ref 7–11)
RBC # BLD AUTO: 3.61 MIL/MM3 (ref 4–5.3)
WBC # BLD AUTO: 17.8 TH/MM3 (ref 4–11)

## 2018-05-27 RX ADMIN — HYDROMORPHONE HYDROCHLORIDE PRN MG: 1 INJECTION, SOLUTION INTRAMUSCULAR; INTRAVENOUS; SUBCUTANEOUS at 15:50

## 2018-05-27 RX ADMIN — CHLORHEXIDINE GLUCONATE SCH PACK: 500 CLOTH TOPICAL at 05:00

## 2018-05-27 RX ADMIN — SODIUM CHLORIDE SCH MLS/HR: 900 INJECTION INTRAVENOUS at 08:54

## 2018-05-27 RX ADMIN — AZTREONAM SCH MLS/HR: 2 INJECTION, POWDER, LYOPHILIZED, FOR SOLUTION INTRAMUSCULAR; INTRAVENOUS at 20:58

## 2018-05-27 RX ADMIN — PHENYTOIN SODIUM SCH MLS/HR: 50 INJECTION INTRAMUSCULAR; INTRAVENOUS at 17:00

## 2018-05-27 RX ADMIN — FAMOTIDINE SCH MG: 10 INJECTION, SOLUTION INTRAVENOUS at 08:10

## 2018-05-27 RX ADMIN — AZTREONAM SCH MLS/HR: 2 INJECTION, POWDER, LYOPHILIZED, FOR SOLUTION INTRAMUSCULAR; INTRAVENOUS at 15:45

## 2018-05-27 RX ADMIN — NYSTATIN SCH ML: 100000 SUSPENSION ORAL at 08:10

## 2018-05-27 RX ADMIN — HYDROMORPHONE HYDROCHLORIDE PRN MG: 1 INJECTION, SOLUTION INTRAMUSCULAR; INTRAVENOUS; SUBCUTANEOUS at 23:13

## 2018-05-27 RX ADMIN — NICOTINE SCH PATCH: 21 PATCH, EXTENDED RELEASE TOPICAL at 08:10

## 2018-05-27 RX ADMIN — MORPHINE SULFATE PRN MG: 2 INJECTION, SOLUTION INTRAMUSCULAR; INTRAVENOUS at 21:04

## 2018-05-27 RX ADMIN — SODIUM CHLORIDE SCH MLS/HR: 900 INJECTION INTRAVENOUS at 00:24

## 2018-05-27 RX ADMIN — HYDROMORPHONE HYDROCHLORIDE PRN MG: 1 INJECTION, SOLUTION INTRAMUSCULAR; INTRAVENOUS; SUBCUTANEOUS at 10:33

## 2018-05-27 RX ADMIN — HYDROMORPHONE HYDROCHLORIDE PRN MG: 1 INJECTION, SOLUTION INTRAMUSCULAR; INTRAVENOUS; SUBCUTANEOUS at 02:08

## 2018-05-27 RX ADMIN — ONDANSETRON PRN MG: 4 TABLET, ORALLY DISINTEGRATING ORAL at 09:24

## 2018-05-27 RX ADMIN — ONDANSETRON PRN MG: 4 TABLET, ORALLY DISINTEGRATING ORAL at 21:04

## 2018-05-27 RX ADMIN — NYSTATIN SCH ML: 100000 SUSPENSION ORAL at 20:57

## 2018-05-27 RX ADMIN — MORPHINE SULFATE PRN MG: 2 INJECTION, SOLUTION INTRAMUSCULAR; INTRAVENOUS at 08:11

## 2018-05-27 RX ADMIN — MORPHINE SULFATE PRN MG: 2 INJECTION, SOLUTION INTRAMUSCULAR; INTRAVENOUS at 03:45

## 2018-05-27 RX ADMIN — HYDROMORPHONE HYDROCHLORIDE PRN MG: 1 INJECTION, SOLUTION INTRAMUSCULAR; INTRAVENOUS; SUBCUTANEOUS at 05:34

## 2018-05-27 RX ADMIN — AZTREONAM SCH MLS/HR: 2 INJECTION, POWDER, LYOPHILIZED, FOR SOLUTION INTRAMUSCULAR; INTRAVENOUS at 03:18

## 2018-05-27 RX ADMIN — NYSTATIN SCH ML: 100000 SUSPENSION ORAL at 17:09

## 2018-05-27 RX ADMIN — HYDROMORPHONE HYDROCHLORIDE PRN MG: 1 INJECTION, SOLUTION INTRAMUSCULAR; INTRAVENOUS; SUBCUTANEOUS at 10:54

## 2018-05-27 RX ADMIN — ONDANSETRON PRN MG: 4 TABLET, ORALLY DISINTEGRATING ORAL at 02:07

## 2018-05-27 RX ADMIN — NYSTATIN SCH ML: 100000 SUSPENSION ORAL at 13:00

## 2018-05-27 RX ADMIN — AZTREONAM SCH MLS/HR: 2 INJECTION, POWDER, LYOPHILIZED, FOR SOLUTION INTRAMUSCULAR; INTRAVENOUS at 08:11

## 2018-05-27 RX ADMIN — SODIUM CHLORIDE SCH MLS/HR: 900 INJECTION INTRAVENOUS at 17:48

## 2018-05-27 RX ADMIN — SODIUM CHLORIDE SCH MLS/HR: 900 INJECTION, SOLUTION INTRAVENOUS at 05:00

## 2018-05-27 RX ADMIN — FAMOTIDINE SCH MG: 10 INJECTION, SOLUTION INTRAVENOUS at 20:57

## 2018-05-27 RX ADMIN — SODIUM CHLORIDE SCH MLS/HR: 900 INJECTION, SOLUTION INTRAVENOUS at 10:29

## 2018-05-27 RX ADMIN — PHENYTOIN SODIUM SCH MLS/HR: 50 INJECTION INTRAMUSCULAR; INTRAVENOUS at 07:00

## 2018-05-27 RX ADMIN — SODIUM CHLORIDE SCH MLS/HR: 900 INJECTION, SOLUTION INTRAVENOUS at 15:48

## 2018-05-27 RX ADMIN — SODIUM CHLORIDE SCH MLS/HR: 900 INJECTION, SOLUTION INTRAVENOUS at 20:58

## 2018-05-27 RX ADMIN — Medication SCH TAB: at 21:04

## 2018-05-27 NOTE — HHI.CCPN
Subjective


Remarks/Hospital Course


Diagnosis:  


(1) Right facial abscess


Diagnosis:  Principal





(2) Sepsis, severe


Diagnosis:  Principal





(3) Optic neuritis


Diagnosis:  Secondary





(4) Multiple sclerosis


Diagnosis:  Secondary





(5) Migraine


Diagnosis:  Secondary





Chief Complaint:  


Right facial swelling








Patient is a 27-year-old  female with past medical history of multiple 

sclerosis, migraine headaches.  Patient states that approximately 2 weeks ago 

started having pain and swelling of the right side of the cheek, which 

continued to increase in size despite treatment with antibiotics as outpatient (

5 days of amoxicillin followed by 7 days of Levofloxacin).  Patient was 

admitted to the [facility] yesterday under the primary care doctor 

and a CT of the face apparently showed right sided abscess. (Unfortunately 

there is no CT images or report for review).  Patient had been receiving 

vancomycin and Levaquin at the outside hospital.  Labs from [facility] reviewed, WBC count was 18.7 other pertinent labs included a potassium 

of 3.2.  Due to lack of improvement and unavailability of ENT consultant, 

patient was transferred to [facility].  I evaluated the patient 

immediately in ICU.  Patient has enlarged facial swelling on the right side of 

the face which is very tender.  Patient also had subjective fever, currently is 

tachycardic heart rate in 120s.  I have requested a repeat CT of the face with 

IV contrast.  I will start her on IV vancomycin, Azactam, Flagyl.  Consult 

requested from oral and maxillofacial surgery.  I am unable to examine the oral 

cavity in detail due to severe pain in mouth opening, but most likely patient 

has dental abscess with spread to surrounding soft tissue





05/26: Tachycardia, fever, leukocytosis. Molar abscess right side will require 

teeth extraction and drainage. Remains septic. Broad antibiotic coverage 

infusing. Suitable for surgery today.





05/27: Molar teeth extracted right side yesterday.  Large abscess with 

plentiful pus.  Abscess cavity opened internally and drains are placed under 

the mandible.  Gram stain showed many white cells as expected but did not help 

us identify an organism.  Culture is pending.  Needless to say, this will 

involve multiple organisms as well as oral anaerobic pathogens.  We will 

continue broad coverage.  She handles her secretions well and is able to 

swallow.





Objective





Vital Signs








  Date Time  Temp Pulse Resp B/P (MAP) Pulse Ox O2 Delivery O2 Flow Rate FiO2


 


5/27/18 04:00 99.0 74 29 112/58 (76) 97   


 


5/26/18 19:00      Room Air  








Result Diagram:  


5/27/18 0445                                                                   

             5/26/18 0404





Objective Remarks


GENERAL: 27-year-old  female, in moderate distress due to mandibular 

pain


SKIN: Warm and dry.


HEAD: Atraumatic. Normocephalic. 


ENT: Right side of the face is less swollen today.  Mouth opening is still 

limited due to pain and this limits oral cavity exam.  No drooling.


NECK: Trachea midline.  No obstruction.  No stridor. Airway widely patent.


CARDIOVASCULAR: Tachycardic rate and rhythm.  S1, S2 normal.  No JVD.


RESPIRATORY: No accessory muscle use. Clear to auscultation.  No adventitious 

sounds. Comfortable respiratory pattern.


GASTROINTESTINAL: Abdomen soft, non-tender, nondistended.  No guarding.  BS 

active.


MUSCULOSKELETAL: Extremities without clubbing, cyanosis, or edema.  Warm, well 

perfused.


NEUROLOGICAL: Awake and alert. No obvious cranial nerve deficits.  Motor 

grossly normal.  Normal speech.





A/P


Assessment and Plan


NEURO: 


Right facial pain


Migraine headaches


Multiple sclerosis


-Morphine as needed for pain, converted to Dilaudid with better relief.


-Avoid excessive sedation





RESP/ENT/ID: 


Right facial swelling (Differential Dental abscess vs Parotitis/stone)


Sepsis


-Nasal cannula oxygen


-DuoNeb every 6 hours as needed


-CT facial bones with IV contrast Stat


-Broad-spectrum antibiotics with vancomycin, Azactam, Flagyl


-OMFS consulted


-Decadron 4 mg IV every 6 hours for facial swelling for 48 hours


-Blood cultures pending.


-Abscess culture pending.





CV: 


Sinus tachycardia/SIRS


-Normal saline IV fluids 1L bolus and 84 ml per hour


-Monitor heart rate blood pressure





GI: 


-Swallow evaluation and then oral diet.  IV famotidine





: 


-Monitor renal function closely.  No indication for Turner catheter at this





HEME: 


-Follow leukocytosis.





ENDO: 


Hypokalemia


-Electrolyte replacement per protocol





PROPH: 


-Bilateral lower extremity SCDs.  Hold off chemical DVT prophylaxis at this 

time in anticipation of recent surgical procedures.  IV famotidine for GI 

prophylaxis





LINES: 


-Utilize peripheral IVs, central line if needed





Overall impression: Patient arrived critically ill with severe sepsis from very 

large mandibular tooth abscess. Airway remains widely patent. Urgent surgical 

drainage and extraction of teeth performed 5/26.  External drainage established 

as well.











Javy Rutledge MD May 27, 2018 08:48

## 2018-05-28 VITALS
OXYGEN SATURATION: 99 % | HEART RATE: 77 BPM | DIASTOLIC BLOOD PRESSURE: 82 MMHG | TEMPERATURE: 98.1 F | SYSTOLIC BLOOD PRESSURE: 126 MMHG | RESPIRATION RATE: 18 BRPM

## 2018-05-28 VITALS
RESPIRATION RATE: 16 BRPM | DIASTOLIC BLOOD PRESSURE: 79 MMHG | OXYGEN SATURATION: 99 % | TEMPERATURE: 98 F | SYSTOLIC BLOOD PRESSURE: 126 MMHG | HEART RATE: 58 BPM

## 2018-05-28 VITALS
DIASTOLIC BLOOD PRESSURE: 81 MMHG | HEART RATE: 67 BPM | OXYGEN SATURATION: 99 % | SYSTOLIC BLOOD PRESSURE: 122 MMHG | RESPIRATION RATE: 16 BRPM | TEMPERATURE: 98 F

## 2018-05-28 VITALS
DIASTOLIC BLOOD PRESSURE: 81 MMHG | OXYGEN SATURATION: 99 % | SYSTOLIC BLOOD PRESSURE: 144 MMHG | HEART RATE: 82 BPM | TEMPERATURE: 98.4 F | RESPIRATION RATE: 18 BRPM

## 2018-05-28 LAB
BASOPHILS # BLD AUTO: 0 TH/MM3 (ref 0–0.2)
BASOPHILS NFR BLD: 0.1 % (ref 0–2)
EOSINOPHIL # BLD: 0 TH/MM3 (ref 0–0.4)
EOSINOPHIL NFR BLD: 0.2 % (ref 0–4)
ERYTHROCYTE [DISTWIDTH] IN BLOOD BY AUTOMATED COUNT: 13.4 % (ref 11.6–17.2)
HCT VFR BLD CALC: 31.5 % (ref 35–46)
HGB BLD-MCNC: 10.5 GM/DL (ref 11.6–15.3)
LYMPHOCYTES # BLD AUTO: 3 TH/MM3 (ref 1–4.8)
LYMPHOCYTES NFR BLD AUTO: 24.9 % (ref 9–44)
MCH RBC QN AUTO: 29.2 PG (ref 27–34)
MCHC RBC AUTO-ENTMCNC: 33.2 % (ref 32–36)
MCV RBC AUTO: 88 FL (ref 80–100)
MONOCYTE #: 1.2 TH/MM3 (ref 0–0.9)
MONOCYTES NFR BLD: 9.7 % (ref 0–8)
NEUTROPHILS # BLD AUTO: 7.8 TH/MM3 (ref 1.8–7.7)
NEUTROPHILS NFR BLD AUTO: 65.1 % (ref 16–70)
PLATELET # BLD: 496 TH/MM3 (ref 150–450)
PMV BLD AUTO: 8.1 FL (ref 7–11)
RBC # BLD AUTO: 3.58 MIL/MM3 (ref 4–5.3)
WBC # BLD AUTO: 12 TH/MM3 (ref 4–11)

## 2018-05-28 RX ADMIN — SODIUM CHLORIDE SCH MLS/HR: 900 INJECTION, SOLUTION INTRAVENOUS at 03:49

## 2018-05-28 RX ADMIN — NYSTATIN SCH ML: 100000 SUSPENSION ORAL at 12:27

## 2018-05-28 RX ADMIN — HYDROMORPHONE HYDROCHLORIDE PRN MG: 1 INJECTION, SOLUTION INTRAMUSCULAR; INTRAVENOUS; SUBCUTANEOUS at 09:15

## 2018-05-28 RX ADMIN — PHENYTOIN SODIUM SCH MLS/HR: 50 INJECTION INTRAMUSCULAR; INTRAVENOUS at 03:00

## 2018-05-28 RX ADMIN — NICOTINE SCH PATCH: 21 PATCH, EXTENDED RELEASE TOPICAL at 09:17

## 2018-05-28 RX ADMIN — SODIUM CHLORIDE SCH MLS/HR: 900 INJECTION INTRAVENOUS at 03:48

## 2018-05-28 RX ADMIN — AZTREONAM SCH MLS/HR: 2 INJECTION, POWDER, LYOPHILIZED, FOR SOLUTION INTRAMUSCULAR; INTRAVENOUS at 03:49

## 2018-05-28 RX ADMIN — HYDROMORPHONE HYDROCHLORIDE PRN MG: 1 INJECTION, SOLUTION INTRAMUSCULAR; INTRAVENOUS; SUBCUTANEOUS at 03:49

## 2018-05-28 RX ADMIN — Medication SCH TAB: at 09:16

## 2018-05-28 RX ADMIN — FAMOTIDINE SCH MG: 10 INJECTION, SOLUTION INTRAVENOUS at 09:15

## 2018-05-28 RX ADMIN — SODIUM CHLORIDE SCH MLS/HR: 900 INJECTION, SOLUTION INTRAVENOUS at 09:30

## 2018-05-28 RX ADMIN — CHLORHEXIDINE GLUCONATE SCH PACK: 500 CLOTH TOPICAL at 04:00

## 2018-05-28 RX ADMIN — AZTREONAM SCH MLS/HR: 2 INJECTION, POWDER, LYOPHILIZED, FOR SOLUTION INTRAMUSCULAR; INTRAVENOUS at 09:18

## 2018-05-28 RX ADMIN — NYSTATIN SCH ML: 100000 SUSPENSION ORAL at 09:15

## 2018-05-28 NOTE — HHI.PR
Subjective


Remarks


pod 2 s/p I&D right face/neck multi space abscess/infection


and extraction of teeth #s 32/31/30





pt seen and examined this morning, nurse  at bedside


aaox3, nad


denies f/c/sob/difficulty breathing/difficulty swallowing


denies nausea,  tolerating po


reports feeling  much better better





Objective





Vital Signs








  Date Time  Temp Pulse Resp B/P (MAP) Pulse Ox O2 Delivery O2 Flow Rate FiO2


 


5/28/18 08:00 98.1 77 18 126/82 (97) 99   


 


5/28/18 04:00 98.0 67 16 122/81 (95) 99   


 


5/28/18 00:00 98.0 58 16 126/79 (95) 99   


 


5/27/18 20:00 98.0 71 16 132/66 (88) 100   


 


5/27/18 19:00     100 Room Air  


 


5/27/18 16:00 98.7 80 21 130/78 (95) 100   


 


5/27/18 15:00  64      


 


5/27/18 12:00 98.7 66 24 116/68 (84) 100   














I/O      


 


 5/27/18 5/27/18 5/27/18 5/28/18 5/28/18 5/28/18





 07:00 15:00 23:00 07:00 15:00 23:00


 


Intake Total   720 ml 480 ml  


 


Balance   720 ml 480 ml  


 


      


 


Intake Oral   720 ml 480 ml  


 


# Voids 4  3 3  


 


# Bowel Movements 0  0   








Result Diagram:  


5/28/18 0401                                                                   

             5/26/18 0402





Other Results


 GRAM STAIN  Final                                                05/27/


        MANY WBC'S


        HEAVY MIXED MAU WITH NO PREDOMINANT MORPHOLOGY





  FLUID CULTURE  Final                                             05/28/


        HEAVY GROWTH NORMAL SKIN MAU


        NO ANAEROBES ISOLATED





  FLUID CULTURE  Preliminary (changed)                             05/27/


        IMMATURE GROWTH OF GRAM POSITIVE MAU -FURTHER ID TO FOLLOW








no  fungi, afb pending


Objective Remarks


significant decrease in right facial/neck edema and erythema


trach midline


drains in neck/mouth in place, no pus noted, no heme noted


wound margins well approximated, sutures intact


mild tenderness to palpation


no elevation fom/tongue, no deviation of uvula


continued increase in mouth opening





Assessment and Plan


Assessment and Plan





pod 2 s/p I&D right face/neck multi space abscess/infection


and extraction of teeth #s 32/31/30


progressing well


irrigated drains with saline


removed drains x 4 





ok to d/c to home from oms standpoint 


f/up dr duran  in 4 days [phone]


soft diet


warm compress right face/neck - 20 min on 20 min off


jaw opening exercise shown


maintain good oral hygiene











Sameer Duran DMD May 28, 2018 09:59

## 2018-05-28 NOTE — HHI.PR
Subjective


Remarks


Nursing denies any deterioration since last night.  Patient herself says she 

feels better today compared to yesterday.  Denies any nausea.





Objective





Vital Signs








  Date Time  Temp Pulse Resp B/P (MAP) Pulse Ox O2 Delivery O2 Flow Rate FiO2


 


5/28/18 08:00 98.1 77 18 126/82 (97) 99   


 


5/28/18 04:00 98.0 67 16 122/81 (95) 99   


 


5/28/18 00:00 98.0 58 16 126/79 (95) 99   


 


5/27/18 20:00 98.0 71 16 132/66 (88) 100   


 


5/27/18 19:00     100 Room Air  


 


5/27/18 16:00 98.7 80 21 130/78 (95) 100   


 


5/27/18 15:00  64      


 


5/27/18 12:00 98.7 66 24 116/68 (84) 100   














I/O      


 


 5/27/18 5/27/18 5/27/18 5/28/18 5/28/18 5/28/18





 07:00 15:00 23:00 07:00 15:00 23:00


 


Intake Total   720 ml 480 ml  


 


Balance   720 ml 480 ml  


 


      


 


Intake Oral   720 ml 480 ml  


 


# Voids 4  3 3  


 


# Bowel Movements 0  0   








Result Diagram:  


5/28/18 0401                                                                   

             5/26/18 0402





Objective Remarks


Drains in place.  Is able to open her mouth partially.  No slurred speech nor 

any muffled speech.  Sitting up in bed awake alert, no acute distress





A/P


Assessment and Plan





Right facial swelling (Differential Dental abscess vs Parotitis/stone)


Sepsis


Status post incision and drainage of the right neck/face multispace 


abscess/infection involving the right, OMFS following


submandibular/sublingual/masseteric spaces and the medial aspect of 


the mandible.  Also, extraction of decayed teeth #32, 31 and 30.


-Broad-spectrum antibiotics with vancomycin, Azactam, Flagyl


-Decadron 4 mg IV every 6 hours for facial swelling for 48 hours


-Blood cultures pending.


-Abscess culture pending.


oral diet as tolerated.  IV famotidine











PROPH: 


-Bilateral lower extremity SCDs.  Hold off chemical DVT prophylaxis at this 

time in anticipation of recent surgical procedures.  IV famotidine for GI 

prophylaxis











Phillip Manrique MD May 28, 2018 09:52

## 2018-05-28 NOTE — HHI.DS
__________________________________________________





Discharge Summary


Admission Date


May 25, 2018 at 13:31


Discharge Date:  May 28, 2018


Admitting Diagnosis


facial abscess





(1) Right facial abscess


Diagnosis:  Principal





(2) Sepsis


ICD Code:  A41.9 - Sepsis, unspecified organism


Diagnosis:  Principal





(3) Optic neuritis


ICD Code:  H46.9 - Unspecified optic neuritis


Diagnosis:  Secondary





(4) Multiple sclerosis


ICD Code:  G35 - Multiple sclerosis


Diagnosis:  Secondary





(5) Migraine


ICD Code:  G43.909 - Migraine, unspecified, not intractable, without status 

migrainosus


Diagnosis:  Secondary





Procedures


Incision and drainage of the right neck/face multispace 


abscess/infection involving the right 


submandibular/sublingual/masseteric spaces and the medial aspect of 


the mandible.  Also, extraction of decayed teeth #32, 31 and 30.


Brief History - From Admission


Patient is a 27-year-old  female with past medical history of multiple 

sclerosis, migraine headaches.  Patient states that approximately 2 weeks ago 

started having pain and swelling of the right side of the cheek, which 

continued to increase in size despite treatment with antibiotics as outpatient (

5 days of amoxicillin followed by 7 days of Levofloxacin).  Patient was 

admitted to the [facility] yesterday under the primary care doctor 

and a CT of the face apparently showed right sided abscess. (Unfortunately 

there is no CT images or report for review).  Patient had been receiving 

vancomycin and Levaquin at the outside hospital.  Labs from [facility] reviewed, WBC count was 18.7 other pertinent labs included a potassium 

of 3.2.  Due to lack of improvement and unavailability of ENT consultant, 

patient was transferred to [facility].  I evaluated the patient 

immediately in ICU.  Patient has enlarged facial swelling on the right side of 

the face which is very tender.  Patient also had subjective fever, currently is 

tachycardic heart rate in 120s.  I have requested a repeat CT of the face with 

IV contrast.  I will start her on IV vancomycin, Azactam, Flagyl.  Consult 

requested from oral and maxillofacial surgery.  I am unable to examine the oral 

cavity in detail due to severe pain in mouth opening, but most likely patient 

has dental abscess with spread to surrounding soft tissue


CBC/BMP:  


5/28/18 0401                                                                   

             5/26/18 0402





Significant Findings





Laboratory Tests








Test


  5/25/18


14:40 5/25/18


15:25 5/26/18


04:02 5/26/18


15:55


 


White Blood Count


  18.2 TH/MM3


(4.0-11.0) 


  


  


 


 


Platelet Count


  468 TH/MM3


(150-450) 


  


  


 


 


Neutrophils (%) (Auto)


  83.1 %


(16.0-70.0) 


  


  


 


 


Lymphocytes (%) (Auto)


  7.3 %


(9.0-44.0) 


  


  


 


 


Monocytes (%) (Auto)


  9.1 %


(0.0-8.0) 


  


  


 


 


Neutrophils # (Auto)


  15.1 TH/MM3


(1.8-7.7) 


  


  


 


 


Monocytes # (Auto)


  1.6 TH/MM3


(0-0.9) 


  


  


 


 


Blood Urea Nitrogen 3 MG/DL (7-18)   6 MG/DL (7-18)  


 


Albumin


  3.1 GM/DL


(3.4-5.0) 


  


  


 


 


Alkaline Phosphatase


  132 U/L


() 


  


  


 


 


Creatinine


  


  


  0.49 MG/DL


(0.50-1.00) 


 


 


Random Glucose


  


  


  135 MG/DL


() 


 


 


Chloride Level


  


  


  108 MEQ/L


() 


 


 


Test


  5/27/18


04:45 5/27/18


14:15 5/28/18


04:01 


 


 


White Blood Count


  17.8 TH/MM3


(4.0-11.0) 


  12.0 TH/MM3


(4.0-11.0) 


 


 


Red Blood Count


  3.61 MIL/MM3


(4.00-5.30) 


  3.58 MIL/MM3


(4.00-5.30) 


 


 


Hemoglobin


  10.5 GM/DL


(11.6-15.3) 


  10.5 GM/DL


(11.6-15.3) 


 


 


Hematocrit


  31.5 %


(35.0-46.0) 


  31.5 %


(35.0-46.0) 


 


 


Platelet Count


  530 TH/MM3


(150-450) 


  496 TH/MM3


(150-450) 


 


 


Neutrophils (%) (Auto)


  90.0 %


(16.0-70.0) 


  


  


 


 


Lymphocytes (%) (Auto)


  4.7 %


(9.0-44.0) 


  


  


 


 


Neutrophils # (Auto)


  16.0 TH/MM3


(1.8-7.7) 


  7.8 TH/MM3


(1.8-7.7) 


 


 


Lymphocytes # (Auto)


  0.8 TH/MM3


(1.0-4.8) 


  


  


 


 


Vancomycin Level Trough


  


  15.3 MCG/ML


(5.0-10.0) 


  


 


 


Monocytes (%) (Auto)


  


  


  9.7 %


(0.0-8.0) 


 


 


Monocytes # (Auto)


  


  


  1.2 TH/MM3


(0-0.9) 


 








Imaging





Last Impressions








Maxillofacial CT 5/25/18 0000 Signed





Impressions: 





 CONCLUSION:





 Mandibular molar tooth abscess with surrounding multilocular perimandibular sof





 t tissue abscess.





  





 








PE at Discharge


partial ROM of jaw limited 2/2 pain, mild edema on right cheek


Hospital Course


Patient was admitted from [facility] to the ICU here at [facility].  Was 

started on broaden antibiotic coverage.  OMFS was consulted and performed I&D 

of right neck/face multi-space abscess/infection involving the right 

submandibular/sublingual/masseteric spaces in the medial aspect of the mandible 

and extraction of teeth 32, 31, and 30.  Patient eventually was tolerating p.o. 

intake and was tolerating p.o. narcotics.  She had her drains removed.  Patient 

has met maximum benefit from hospitalization and is clinically stable for 

discharge.


Pt Condition on Discharge:  Stable


Discharge Disposition:  Discharge Home


Discharge Time:  <= 30 minutes


Discharge Instructions


DIET: Follow Instructions for:  Soft Diet


Follow up Referrals:  


PCP Follow-up - 1 Week


Surgical - 1 Week with Sameer Duran DMD





New Medications:  


Clindamycin (Clindamycin) 300 Mg Cap


300 MG PO Q6HR for Infection, #27 CAP 0 Refills





Levofloxacin (Levofloxacin) 750 Mg Tablet


750 MG PO DAILY for Infection, #8 TAB 0 Refills





Oxycodone HCl/Acetaminophen (Oxycodon-Acetaminophen 7.5-325) 7.5 Mg-325 Mg 

Tablet


1 TAB PO Q6H PRN for pain, #30 TAB

















Phillip Manrique MD May 28, 2018 14:17

## 2018-05-28 NOTE — HHI.DCPOC
Discharge Care Plan


Diagnosis:  


(1) Right facial abscess


Goals to Promote Your Health


* To prevent worsening of your condition and complications


* To maintain your health at the optimal level


Directions to Meet Your Goals


*** Take your medications as prescribed


*** Follow your dietary instruction


*** Follow activity as directed








*** Keep your appointments as scheduled


*** Take your immunizations and boosters as scheduled


*** If your symptoms worsen call your PCP, if no PCP go to Urgent Care Center 

or Emergency Room***


*** Smoking is Dangerous to Your Health. Avoid second hand smoke***


***Call the 24-hour hour crisis hotline for domestic abuse at 1-617.715.8484***











Phillip Manrique MD May 28, 2018 14:15